# Patient Record
Sex: FEMALE
[De-identification: names, ages, dates, MRNs, and addresses within clinical notes are randomized per-mention and may not be internally consistent; named-entity substitution may affect disease eponyms.]

---

## 2018-10-08 ENCOUNTER — HOSPITAL ENCOUNTER (EMERGENCY)
Dept: HOSPITAL 14 - H.ER | Age: 38
Discharge: HOME | End: 2018-10-08
Payer: COMMERCIAL

## 2018-10-08 VITALS
DIASTOLIC BLOOD PRESSURE: 74 MMHG | HEART RATE: 82 BPM | TEMPERATURE: 98 F | RESPIRATION RATE: 18 BRPM | OXYGEN SATURATION: 100 % | SYSTOLIC BLOOD PRESSURE: 127 MMHG

## 2018-10-08 VITALS — BODY MASS INDEX: 51.3 KG/M2

## 2018-10-08 DIAGNOSIS — M62.830: Primary | ICD-10-CM

## 2018-10-08 PROCEDURE — 81025 URINE PREGNANCY TEST: CPT

## 2018-10-08 PROCEDURE — 96372 THER/PROPH/DIAG INJ SC/IM: CPT

## 2018-10-08 PROCEDURE — 99283 EMERGENCY DEPT VISIT LOW MDM: CPT

## 2018-10-08 NOTE — ED PDOC
Lower Extremity Pain/Injury


Chief Complaint (Provider): Left Buttock and Thigh Pain


History Per: Patient


History/Exam Limitations: no limitations


Onset/Duration Of Symptoms: Days (x1)


Current Symptoms Are (Timing): Still Present


Additional Complaint(s): 


38 year old female presents to the ED for evaluation of left sided buttock pain 

for one day which radiates down her left leg to the knee. Reports pain is worse 

with movement and sitting for long periods of time, and has been taking Celebrex

without relief, last dose this morning. She does note that over the weekend, she

exercised heavily, which is new for her. Otherwise, denies fever, chills, 

numbness, paresthesias, weakness, trauma, calf pain / swelling, groin swelling, 

shortness of breath, and cough.





PMD: none provided





<Linnette Alvarez - Last Filed: 10/09/18 23:58>





<Jennifer Lowery - Last Filed: 10/10/18 17:12>


Time Seen by Provider: 10/08/18 16:12


Chief Complaint (Nursing): Lower Extremity Problem/Injury





Supervising Attending Note





- Attestation:


I have personally seen and examined this patient.: No


I have reviewed all pertinent clinical information, including history, physical 

exam and plan: Yes





<Jennifer Lowery - Last Filed: 10/10/18 17:12>





Past Medical History


Reviewed: Historical Data, Nursing Documentation, Vital Signs


Vital Signs: 





                                Last Vital Signs











Temp  98 F   10/08/18 15:44


 


Pulse  82   10/08/18 15:44


 


Resp  18   10/08/18 15:44


 


BP  127/74   10/08/18 15:44


 


Pulse Ox  100   10/08/18 15:44














- Medical History


PMH: Asthma





- Surgical History


Surgical History: No Surg Hx





- Family History


Family History: States: Diabetes, Hypertension





- Social History


Current smoker - smoking cessation education provided: No


Alcohol: None


Drugs: Denies





<Linnette Alvarez - Last Filed: 10/09/18 23:58>


Vital Signs: 





                                Last Vital Signs











Temp  98 F   10/08/18 15:44


 


Pulse  82   10/08/18 15:44


 


Resp  18   10/08/18 15:44


 


BP  127/74   10/08/18 15:44


 


Pulse Ox  100   10/09/18 23:58














<Jennifer Lowery - Last Filed: 10/10/18 17:12>





- Home Medications


Home Medications: 


                                Ambulatory Orders











 Medication  Instructions  Recorded


 


Dicyclomine [Bentyl] 20 mg PO Q12 PRN #20 tab 01/13/16


 


Ondansetron ODT [Zofran ODT] 4 mg PO Q6H PRN #16 odt 01/13/16


 


Azithromycin [Zithromax Z-Jason] 250 mg PO DAILY #1 packet 06/07/16


 


Methylprednisolone [Medrol Dose 4 mg PO ASDIR #21 mg 06/07/16





Pack (21 tabs)]  


 


RX: Benzonatate 200 mg PO TID PRN #20 capsule 06/07/16


 


Cyclobenzaprine [Flexeril] 5 mg PO Q8H PRN #21 tab 10/08/18


 


RX: Ibuprofen [Motrin Tab] 600 mg PO Q6H PRN #30 tab 10/08/18














- Allergies


Allergies/Adverse Reactions: 


                                    Allergies











Allergy/AdvReac Type Severity Reaction Status Date / Time


 


No Known Allergies Allergy   Verified 01/12/16 21:36














Review of Systems


ROS Statement: Except As Marked, All Systems Reviewed And Found Negative


Constitutional: Negative for: Fever, Chills


Respiratory: Negative for: Cough, Shortness of Breath


Musculoskeletal: Negative for: Other (calf pain or swelling; groin swelling)


Neurological: Negative for: Weakness, Numbness, Other (paresthesias)





<Linnette Alvarez - Last Filed: 10/09/18 23:58>





Physical Exam





- Reviewed


Nursing Documentation Reviewed: Yes


Vital Signs Reviewed: Yes





- Physical Exam


Appears: Positive for: No Acute Distress (but obese)


Skin: Positive for: Normal Color, Warm, Dry.  Negative for: Rash


Cardiovascular/Chest: Positive for: Regular Rate, Rhythm


Respiratory: Positive for: Normal Breath Sounds.  Negative for: Accessory Muscle

Use, Respiratory Distress


Back: Positive for: Normal Inspection.  Negative for: L CVA Tenderness, R CVA 

Tenderness, Vertebral Tenderness


Extremity: Positive for: Normal ROM, Other (left buttock mildly tender to 

palpation; left lateral thigh midly tender to palpation; neurovascular intact 

bilateral legs)


Neurologic/Psych: Positive for: Alert, Oriented (x3), Other (equal strength).  

Negative for: Motor/Sensory Deficits





<Linnetet Alvarez - Last Filed: 10/09/18 23:58>





- ECG


O2 Sat by Pulse Oximetry: 100 (RA)


Pulse Ox Interpretation: Normal





<Linnette Alvarez - Last Filed: 10/09/18 23:58>





Medical Decision Making


Medical Decision Making: 


Time: 1613


Initial Impression: muscle strain


Initial Plan:


--U-dip


--Flexeril 10mg PO


--Toradol 60mg IM





Flexeril and Ibuprofen scripts given as well as advice to  avoid strenuous 

activity, apply heat to the area and follow up with PMD. Instructed to return to

ED if sx worsen.





 

--------------------------------------------------------------------------------


-----------------


Scribe Attestation:


Documented by Marilee Morin, acting as a scribe for Linnette Alvarez PA-C.


Provider Scribe Attestation:


All medical record entries made by the Scribe were at my direction and 

personally dictated by me. I have reviewed the chart and agree that the record 

accurately reflects my personal performance of the history, physical exam, 

medical decision making, and the department course for this patient. I have also

personally directed, reviewed, and agree with the discharge instructions and 

disposition.





<Linnette Alvarez - Last Filed: 10/09/18 23:58>





Disposition





- Disposition


Disposition: Routine/Home


Disposition Time: 18:00





<Linnette Alvarez - Last Filed: 10/09/18 23:58>





<Jennifer Lowery - Last Filed: 10/10/18 17:12>





- Clinical Impression


Clinical Impression: 


 Muscle spasm








- Disposition


Referrals: 


[facility] [Outside]


Condition: IMPROVED


Additional Instructions: 


Take flexeril once every 8 hours as needed for pain


Take 600mg ibuprofen every 6 hours with food as needed for pain


Avoid strenuous activity


heat or ice as tolerated 


stretching


Followup with primary or clinic within 2 days


Return to ED if symptoms persist or worsen


Prescriptions: 


Cyclobenzaprine [Flexeril] 5 mg PO Q8H PRN #21 tab


 PRN Reason: spasm


RX: Ibuprofen [Motrin Tab] 600 mg PO Q6H PRN #30 tab


 PRN Reason: Pain, Mild (1-3)


Forms:  [facility] (English), [facility] ED School/Work Excuse